# Patient Record
Sex: FEMALE | Race: WHITE | NOT HISPANIC OR LATINO
[De-identification: names, ages, dates, MRNs, and addresses within clinical notes are randomized per-mention and may not be internally consistent; named-entity substitution may affect disease eponyms.]

---

## 2020-06-18 ENCOUNTER — TRANSCRIPTION ENCOUNTER (OUTPATIENT)
Age: 40
End: 2020-06-18

## 2021-09-12 ENCOUNTER — EMERGENCY (EMERGENCY)
Facility: HOSPITAL | Age: 41
LOS: 1 days | Discharge: HOME | End: 2021-09-12
Attending: EMERGENCY MEDICINE | Admitting: EMERGENCY MEDICINE
Payer: MEDICARE

## 2021-09-12 VITALS
OXYGEN SATURATION: 96 % | WEIGHT: 125 LBS | HEART RATE: 78 BPM | HEIGHT: 63 IN | DIASTOLIC BLOOD PRESSURE: 78 MMHG | TEMPERATURE: 98 F | RESPIRATION RATE: 18 BRPM | SYSTOLIC BLOOD PRESSURE: 125 MMHG

## 2021-09-12 VITALS
SYSTOLIC BLOOD PRESSURE: 127 MMHG | RESPIRATION RATE: 18 BRPM | HEART RATE: 82 BPM | TEMPERATURE: 98 F | OXYGEN SATURATION: 96 % | DIASTOLIC BLOOD PRESSURE: 82 MMHG

## 2021-09-12 DIAGNOSIS — Z91.018 ALLERGY TO OTHER FOODS: ICD-10-CM
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
## 2021-09-12 DIAGNOSIS — S52.502A UNSPECIFIED FRACTURE OF THE LOWER END OF LEFT RADIUS, INITIAL ENCOUNTER FOR CLOSED FRACTURE: ICD-10-CM
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
## 2021-09-12 DIAGNOSIS — M25.532 PAIN IN LEFT WRIST: ICD-10-CM
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
## 2021-09-12 PROCEDURE — 73110 X-RAY EXAM OF WRIST: CPT | Mod: 26,LT

## 2021-09-12 PROCEDURE — 99283 EMERGENCY DEPT VISIT LOW MDM: CPT

## 2021-09-12 RX ORDER — ACETAMINOPHEN 500 MG
975 TABLET ORAL ONCE
Refills: 0 | Status: COMPLETED | OUTPATIENT
Start: 2021-09-12 | End: 2021-09-12

## 2021-09-12 RX ORDER — OXYCODONE AND ACETAMINOPHEN 5; 325 MG/1; MG/1
1 TABLET ORAL
Qty: 12 | Refills: 0
Start: 2021-09-12

## 2021-09-12 RX ORDER — OXYCODONE HYDROCHLORIDE 5 MG/1
5 TABLET ORAL ONCE
Refills: 0 | Status: DISCONTINUED | OUTPATIENT
Start: 2021-09-12 | End: 2021-09-12

## 2021-09-12 RX ADMIN — Medication 975 MILLIGRAM(S): at 15:02

## 2021-09-12 RX ADMIN — OXYCODONE HYDROCHLORIDE 5 MILLIGRAM(S): 5 TABLET ORAL at 15:28

## 2021-09-12 NOTE — ED PROVIDER NOTE - CLINICAL SUMMARY MEDICAL DECISION MAKING FREE TEXT BOX
Patient fell on outstreatched LEFT hand while playing with dog, with injury to LEFT wrist.  Went to [facility], had xray showing distal radius fracture.  Came here, was medicated, evaluated, seen by ortho, wrist reduced and splinted, repeat xrays performed showing acceptable reduction.  Will f/u for cast with ortho on 9/20.

## 2021-09-12 NOTE — ED ADULT NURSE NOTE - CHIEF COMPLAINT QUOTE
sent from [facility] for further evaluation of L wrist pain s/p slip and fall. arrives with sling and splint. +arrives with radiology, diagnosed with fx

## 2021-09-12 NOTE — ED PROVIDER NOTE - PATIENT PORTAL LINK FT
You can access the FollowMyHealth Patient Portal offered by [facility] by registering at the following website: [email or web address]. By joining [facility]’s FollowMyHealth portal, you will also be able to view your health information using other applications (apps) compatible with our system.

## 2021-09-12 NOTE — ED ADULT NURSE NOTE - OBJECTIVE STATEMENT
Pt sent from [facility]. Dx with Lt wrist fracture s/p fall today.

## 2021-09-12 NOTE — ED PROVIDER NOTE - CARE PROVIDER_API CALL
Reg Barros)  Orthopaedic Surgery Surgery  [address] [place] [address], [address]  [place] [address]  Phone: [phone]  Fax: [phone]  Follow Up Time:

## 2021-09-12 NOTE — ED PROVIDER NOTE - NSFOLLOWUPINSTRUCTIONS_ED_ALL_ED_FT
Keep splint dry, using garbage bag while showering.  Keep extremity elevated whenever possible.    See the orthopedic doctor (Dr. Barros) on 9/20.  Please call for an appointment in the morning of that day, if possible.  He will be expecting you.  He has offices on [facility] but also here at [facility] on the 6th floor.    Return immediately if pain, numbness, swelling, redness, loss of function, or if you have any other problems or concerns.  Take medication as prescribed.  Do not drive a car, drink alcohol, or operate machinery while taking the medication.    Please see your primary care doctor in the next 2-3 days for a repeat evaluation.  Please take all of today's paperwork with you.  If you don't have a doctor or would like help finding a new one, please contact our call center at [phone].    Please return immediately to the Emergency Department if you have pain, fever, trouble breathing, a rash, or if you have any other problems or concerns at all.   You can reach us at [phone], 24 hours a day, 7 days a week.

## 2021-09-12 NOTE — ED PROVIDER NOTE - OBJECTIVE STATEMENT
Nontoxic well appearing patient in no distress presents via EMS from [facility]. Patient states that she fell while playing with her dog outside landing on her non-dominant LEFT wrist.  Patient reports pain and tenderness to LEFT wrist since.  Patient was able to walk to [facility] where a 3 view left wrist xray was performed showing a comminuted impacted left distal radius fracture. Patient was placed into a splint and sent here for further evaluation.  Patient reports only mild pain at this time.  Mild swelling, no obvious deformity.  NO redness, no bone exposed.  Patient denies numbness, tingling, or loss of function.  NO head injury, no other injuries or complaints.  No LOS or syncope.  Fall was due to her feet getting tangled up while throwing a ball for her dog.

## 2021-09-12 NOTE — ED PROVIDER NOTE - PROGRESS NOTE DETAILS
Xrays from [facility] reviewed via CDROM confirming distal radius fracture.    Case d/w Dr. Barros (ortho) who will be in to see patient shortly.  Patient declines narcotic pain medication at this time, requesting only Tylenol. Dr. Barros at bedside will do reduction and splint/arrange followup Reduction and splinting performed by ortho.  Neurovascularly intact afterwards.  Sent for post-reduction, will f/u with Dr. Barros on 9/20 in the AM.

## 2021-09-12 NOTE — ED ADULT TRIAGE NOTE - CHIEF COMPLAINT QUOTE
sent from [facility] for further evaluation of R wrist pain s/p slip and fall. arrives with sling and splint. +arrives with radiology sent from [facility] for further evaluation of L wrist pain s/p slip and fall. arrives with sling and splint. +arrives with radiology sent from [facility] for further evaluation of L wrist pain s/p slip and fall. arrives with sling and splint. +arrives with radiology, diagnosed with fx

## 2021-09-12 NOTE — ED PROVIDER NOTE - PHYSICAL EXAMINATION
LEFT forearm in pre-katharina volar splint and sling on arrival.  Splint/sling removed and extremity examined.  Mild pain left wrist with tenderness especially on active or passive ROM.  Distal pulses, movement, sensation, strength all intact.  Good cap refill.  Normal tendon exam.  No open wounds.  No proximal injury or pain.  Mild swelling, no discoloration, no obvious deformity.

## 2021-09-12 NOTE — CONSULT NOTE ADULT - SUBJECTIVE AND OBJECTIVE BOX
40F s/p FOOSH left wrist this morning while walking dog. Experienced pain in left wrist, noted deformity. Presented to [facility] ER for evaluation. No medical history aside from MR. Left hand dominant. First time injury. First time fracture. Complaining of left wrist pain. Was seen initially at [facility] who recommended evaluation in ER.      PHMx: none  PSHJx: none  Meds/allergies: NKADA  Social Hx: None  ROS as above    ROS as noted in HPI  Constitutional: Healthy, appearing stated age; well developed, well nourished  Cognitive: Alert and oriented times three with normal mood and affect  Neurologic: Normal sensation and motor function  Skin: Head, neck, and extremities skin is warm, dry and intact without rashes or lesions  Lymphatics: No evidence of lymphadenopathy on the neck or axilla  Neck: No masses, trachea midline  Respiratory: Rate, phythm and effort within normal limits  Cardiovascular: Periperhal pulses 2+ bilaterally, no edema    Left wrist: no open wounds. Obvious deformity, tenderness to palpation.  Unable to tolerate ROM due to pain.  SILT Ax/M/U/R  Firing Wf/WE/io  brisk capillary refill    Imaging;  3 radiographs of left wrist obtained at [facility] were reviewed. Findings discussed with patient and mother. Dorsally displaced and angulated distal radius fracture. Loss of radial height, inclination and volar tilt.    Procedure; closed reduction of left wrist performed.  Post reduction Xray demonstrate improved alignment of fracture.  Well padded and molded sugar tong splint applied.  See procedure note.    Assessment: 40F with left distal radius fracture s/p successful closed reduction    Plan  1. NWB LUE in sling.  2. Follow up in 10 days  3. No surgical intervention      Reg Barros MD  Orthopaedic Surgery    [address] [place] [address] [address] [place] [address]  Office: [phone]

## 2021-09-12 NOTE — PROCEDURE NOTE - ADDITIONAL PROCEDURE DETAILS
Reg Barros MD  Orthopaedic Surgery    [address] [place] [address] [address]  [place] [address]  Office: [phone]

## 2021-09-12 NOTE — ED ADULT NURSE NOTE - DISCHARGE DATE/TIME
Was at [facility] for etoh abuse/withdrawal early December. Left ama  Was told to follow up then  Not appropriate to use benzos with etoh.  We cannot rf, amari w/o reevaluation 12-Sep-2021 16:29

## 2021-09-20 ENCOUNTER — APPOINTMENT (OUTPATIENT)
Dept: RADIOLOGY | Facility: CLINIC | Age: 41
End: 2021-09-20

## 2021-09-20 ENCOUNTER — OUTPATIENT (OUTPATIENT)
Dept: OUTPATIENT SERVICES | Facility: HOSPITAL | Age: 41
LOS: 1 days | End: 2021-09-20
Payer: MEDICARE

## 2021-09-20 PROBLEM — Z78.9 OTHER SPECIFIED HEALTH STATUS: Chronic | Status: ACTIVE | Noted: 2021-09-12

## 2021-09-20 PROCEDURE — 73110 X-RAY EXAM OF WRIST: CPT | Mod: 26,LT

## 2021-10-18 ENCOUNTER — OUTPATIENT (OUTPATIENT)
Dept: OUTPATIENT SERVICES | Facility: HOSPITAL | Age: 41
LOS: 1 days | End: 2021-10-18
Payer: MEDICARE

## 2021-10-18 ENCOUNTER — APPOINTMENT (OUTPATIENT)
Dept: RADIOLOGY | Facility: CLINIC | Age: 41
End: 2021-10-18

## 2021-10-18 PROCEDURE — 73110 X-RAY EXAM OF WRIST: CPT | Mod: 26,LT

## 2021-11-29 ENCOUNTER — OUTPATIENT (OUTPATIENT)
Dept: OUTPATIENT SERVICES | Facility: HOSPITAL | Age: 41
LOS: 1 days | End: 2021-11-29
Payer: MEDICARE

## 2021-11-29 ENCOUNTER — APPOINTMENT (OUTPATIENT)
Dept: RADIOLOGY | Facility: CLINIC | Age: 41
End: 2021-11-29

## 2021-11-29 PROCEDURE — 73110 X-RAY EXAM OF WRIST: CPT | Mod: 26,LT

## 2021-12-21 PROBLEM — Z12.39 SCREENING FOR BREAST CANCER: Status: ACTIVE | Noted: 2021-12-21

## 2021-12-21 PROBLEM — Z11.3 SCREENING FOR STD (SEXUALLY TRANSMITTED DISEASE): Status: ACTIVE | Noted: 2021-12-21

## 2021-12-22 ENCOUNTER — APPOINTMENT (OUTPATIENT)
Dept: INTERNAL MEDICINE | Facility: CLINIC | Age: 41
End: 2021-12-22
Payer: MEDICARE

## 2021-12-22 VITALS
HEIGHT: 63.5 IN | HEART RATE: 88 BPM | BODY MASS INDEX: 21.87 KG/M2 | WEIGHT: 125 LBS | TEMPERATURE: 97.9 F | OXYGEN SATURATION: 97 % | DIASTOLIC BLOOD PRESSURE: 73 MMHG | SYSTOLIC BLOOD PRESSURE: 111 MMHG

## 2021-12-22 DIAGNOSIS — Z12.39 ENCOUNTER FOR OTHER SCREENING FOR MALIGNANT NEOPLASM OF BREAST: ICD-10-CM

## 2021-12-22 DIAGNOSIS — Z11.3 ENCOUNTER FOR SCREENING FOR INFECTIONS WITH A PREDOMINANTLY SEXUAL MODE OF TRANSMISSION: ICD-10-CM

## 2021-12-22 PROCEDURE — 99385 PREV VISIT NEW AGE 18-39: CPT | Mod: GY,25

## 2021-12-22 PROCEDURE — 36415 COLL VENOUS BLD VENIPUNCTURE: CPT

## 2021-12-23 LAB
ALBUMIN SERPL ELPH-MCNC: 4.8 G/DL
ALP BLD-CCNC: 47 U/L
ALT SERPL-CCNC: 12 U/L
ANION GAP SERPL CALC-SCNC: 12 MMOL/L
AST SERPL-CCNC: 13 U/L
BASOPHILS # BLD AUTO: 0.04 K/UL
BASOPHILS NFR BLD AUTO: 0.6 %
BILIRUB SERPL-MCNC: 0.2 MG/DL
BUN SERPL-MCNC: 13 MG/DL
CALCIUM SERPL-MCNC: 9.9 MG/DL
CHLORIDE SERPL-SCNC: 102 MMOL/L
CHOLEST SERPL-MCNC: 211 MG/DL
CO2 SERPL-SCNC: 25 MMOL/L
CREAT SERPL-MCNC: 0.81 MG/DL
EOSINOPHIL # BLD AUTO: 0.14 K/UL
EOSINOPHIL NFR BLD AUTO: 2 %
ESTIMATED AVERAGE GLUCOSE: 105 MG/DL
GLUCOSE SERPL-MCNC: 84 MG/DL
HBA1C MFR BLD HPLC: 5.3 %
HCT VFR BLD CALC: 41.9 %
HDLC SERPL-MCNC: 54 MG/DL
HGB BLD-MCNC: 13 G/DL
HIV1+2 AB SPEC QL IA.RAPID: NONREACTIVE
IMM GRANULOCYTES NFR BLD AUTO: 0.3 %
LDLC SERPL CALC-MCNC: 132 MG/DL
LYMPHOCYTES # BLD AUTO: 2.09 K/UL
LYMPHOCYTES NFR BLD AUTO: 29.2 %
MAN DIFF?: NORMAL
MCHC RBC-ENTMCNC: 29.1 PG
MCHC RBC-ENTMCNC: 31 GM/DL
MCV RBC AUTO: 93.7 FL
MONOCYTES # BLD AUTO: 0.69 K/UL
MONOCYTES NFR BLD AUTO: 9.6 %
NEUTROPHILS # BLD AUTO: 4.18 K/UL
NEUTROPHILS NFR BLD AUTO: 58.3 %
NONHDLC SERPL-MCNC: 157 MG/DL
PLATELET # BLD AUTO: 277 K/UL
POTASSIUM SERPL-SCNC: 4.3 MMOL/L
PROT SERPL-MCNC: 7 G/DL
RBC # BLD: 4.47 M/UL
RBC # FLD: 13.2 %
SODIUM SERPL-SCNC: 139 MMOL/L
TRIGL SERPL-MCNC: 127 MG/DL
WBC # FLD AUTO: 7.16 K/UL

## 2022-11-22 ENCOUNTER — APPOINTMENT (OUTPATIENT)
Dept: INTERNAL MEDICINE | Facility: CLINIC | Age: 42
End: 2022-11-22

## 2022-11-22 ENCOUNTER — LABORATORY RESULT (OUTPATIENT)
Age: 42
End: 2022-11-22

## 2022-11-22 VITALS
WEIGHT: 129 LBS | SYSTOLIC BLOOD PRESSURE: 127 MMHG | BODY MASS INDEX: 22.57 KG/M2 | HEART RATE: 89 BPM | TEMPERATURE: 98.6 F | OXYGEN SATURATION: 96 % | HEIGHT: 63.5 IN | DIASTOLIC BLOOD PRESSURE: 84 MMHG

## 2022-11-22 DIAGNOSIS — E04.9 NONTOXIC GOITER, UNSPECIFIED: ICD-10-CM

## 2022-11-22 DIAGNOSIS — Z13.220 ENCOUNTER FOR SCREENING FOR LIPOID DISORDERS: ICD-10-CM

## 2022-11-22 DIAGNOSIS — Z23 ENCOUNTER FOR IMMUNIZATION: ICD-10-CM

## 2022-11-22 PROCEDURE — 36415 COLL VENOUS BLD VENIPUNCTURE: CPT

## 2022-11-22 PROCEDURE — G0439: CPT

## 2022-11-22 PROCEDURE — G0008: CPT

## 2022-11-22 PROCEDURE — 90686 IIV4 VACC NO PRSV 0.5 ML IM: CPT

## 2022-11-23 LAB
ALBUMIN SERPL ELPH-MCNC: 4.5 G/DL
ALP BLD-CCNC: 60 U/L
ALT SERPL-CCNC: 8 U/L
ANION GAP SERPL CALC-SCNC: 11 MMOL/L
APPEARANCE: ABNORMAL
AST SERPL-CCNC: 12 U/L
BASOPHILS # BLD AUTO: 0.04 K/UL
BASOPHILS NFR BLD AUTO: 0.4 %
BILIRUB SERPL-MCNC: <0.2 MG/DL
BILIRUBIN URINE: NEGATIVE
BLOOD URINE: ABNORMAL
BUN SERPL-MCNC: 10 MG/DL
CALCIUM SERPL-MCNC: 9.7 MG/DL
CHLORIDE SERPL-SCNC: 103 MMOL/L
CHOLEST SERPL-MCNC: 217 MG/DL
CO2 SERPL-SCNC: 26 MMOL/L
COLOR: ABNORMAL
CREAT SERPL-MCNC: 0.7 MG/DL
EGFR: 112 ML/MIN/1.73M2
EOSINOPHIL # BLD AUTO: 0.22 K/UL
EOSINOPHIL NFR BLD AUTO: 2.5 %
ESTIMATED AVERAGE GLUCOSE: 108 MG/DL
GLUCOSE QUALITATIVE U: NEGATIVE
GLUCOSE SERPL-MCNC: 106 MG/DL
HBA1C MFR BLD HPLC: 5.4 %
HCT VFR BLD CALC: 42 %
HDLC SERPL-MCNC: 52 MG/DL
HGB BLD-MCNC: 13.1 G/DL
IMM GRANULOCYTES NFR BLD AUTO: 0.2 %
KETONES URINE: NORMAL
LDLC SERPL CALC-MCNC: 144 MG/DL
LEUKOCYTE ESTERASE URINE: ABNORMAL
LYMPHOCYTES # BLD AUTO: 1.73 K/UL
LYMPHOCYTES NFR BLD AUTO: 19.4 %
MAN DIFF?: NORMAL
MCHC RBC-ENTMCNC: 29.2 PG
MCHC RBC-ENTMCNC: 31.2 GM/DL
MCV RBC AUTO: 93.8 FL
MONOCYTES # BLD AUTO: 1.09 K/UL
MONOCYTES NFR BLD AUTO: 12.2 %
NEUTROPHILS # BLD AUTO: 5.82 K/UL
NEUTROPHILS NFR BLD AUTO: 65.3 %
NITRITE URINE: NEGATIVE
NONHDLC SERPL-MCNC: 165 MG/DL
PH URINE: 6.5
PLATELET # BLD AUTO: 326 K/UL
POTASSIUM SERPL-SCNC: 4.7 MMOL/L
PROT SERPL-MCNC: 7.1 G/DL
PROTEIN URINE: ABNORMAL
RBC # BLD: 4.48 M/UL
RBC # FLD: 13 %
SODIUM SERPL-SCNC: 140 MMOL/L
SPECIFIC GRAVITY URINE: 1.04
T4 FREE SERPL-MCNC: 1.2 NG/DL
THYROGLOB AB SERPL-ACNC: <20 IU/ML
THYROPEROXIDASE AB SERPL IA-ACNC: <10 IU/ML
TRIGL SERPL-MCNC: 106 MG/DL
TSH SERPL-ACNC: 0.66 UIU/ML
UROBILINOGEN URINE: NORMAL
WBC # FLD AUTO: 8.92 K/UL

## 2022-11-25 ENCOUNTER — RESULT REVIEW (OUTPATIENT)
Age: 42
End: 2022-11-25

## 2022-11-25 ENCOUNTER — OUTPATIENT (OUTPATIENT)
Dept: OUTPATIENT SERVICES | Facility: HOSPITAL | Age: 42
LOS: 1 days | End: 2022-11-25

## 2022-11-25 ENCOUNTER — APPOINTMENT (OUTPATIENT)
Dept: ULTRASOUND IMAGING | Facility: CLINIC | Age: 42
End: 2022-11-25

## 2022-11-25 PROCEDURE — 76536 US EXAM OF HEAD AND NECK: CPT | Mod: 26

## 2023-03-14 ENCOUNTER — OUTPATIENT (OUTPATIENT)
Dept: OUTPATIENT SERVICES | Facility: HOSPITAL | Age: 43
LOS: 1 days | End: 2023-03-14

## 2023-03-14 ENCOUNTER — APPOINTMENT (OUTPATIENT)
Dept: ULTRASOUND IMAGING | Facility: CLINIC | Age: 43
End: 2023-03-14
Payer: MEDICARE

## 2023-03-14 ENCOUNTER — APPOINTMENT (OUTPATIENT)
Dept: MAMMOGRAPHY | Facility: CLINIC | Age: 43
End: 2023-03-14
Payer: MEDICARE

## 2023-03-14 PROCEDURE — G0279: CPT | Mod: 26

## 2023-03-14 PROCEDURE — 76641 ULTRASOUND BREAST COMPLETE: CPT | Mod: 26,50,GZ

## 2023-03-14 PROCEDURE — 77066 DX MAMMO INCL CAD BI: CPT | Mod: 26

## 2023-04-17 ENCOUNTER — APPOINTMENT (OUTPATIENT)
Dept: DERMATOLOGY | Facility: CLINIC | Age: 43
End: 2023-04-17
Payer: MEDICARE

## 2023-04-17 DIAGNOSIS — Z13.21 ENCOUNTER FOR SCREENING FOR NUTRITIONAL DISORDER: ICD-10-CM

## 2023-04-17 PROCEDURE — 99204 OFFICE O/P NEW MOD 45 MIN: CPT

## 2023-04-17 NOTE — HISTORY OF PRESENT ILLNESS
[FreeTextEntry1] : Moles [de-identified] : No personal history of skin cancer or abnormal moles. No family history of skin cancer. No other new or changing lesions. Concerned about back because cannot see. \par

## 2023-04-17 NOTE — PHYSICAL EXAM
[Alert] : alert [Oriented x 3] : ~L oriented x 3 [Well Nourished] : well nourished [Conjunctiva Non-injected] : conjunctiva non-injected [No Visual Lymphadenopathy] : no visual  lymphadenopathy [No Clubbing] : no clubbing [No Edema] : no edema [No Bromhidrosis] : no bromhidrosis [No Chromhidrosis] : no chromhidrosis [Full Body Skin Exam Performed] : performed [FreeTextEntry3] : white skin, medium brown papules L upper arm and R forearm, flaking of scalp\par brown stuck on papules back\par legs with follicular erythema and patchy alopecia with long corkscrew hairs

## 2023-04-24 LAB — VIT C SERPL-MCNC: 0.8 MG/DL

## 2023-04-25 ENCOUNTER — NON-APPOINTMENT (OUTPATIENT)
Age: 43
End: 2023-04-25

## 2023-07-18 NOTE — ED ADULT NURSE NOTE - TEMPLATE
Orthopedic Gabapentin Counseling: I discussed with the patient the risks of gabapentin including but not limited to dizziness, somnolence, fatigue and ataxia.

## 2023-11-02 ENCOUNTER — LABORATORY RESULT (OUTPATIENT)
Age: 43
End: 2023-11-02

## 2023-11-02 ENCOUNTER — APPOINTMENT (OUTPATIENT)
Dept: INTERNAL MEDICINE | Facility: CLINIC | Age: 43
End: 2023-11-02
Payer: MEDICARE

## 2023-11-02 VITALS
HEART RATE: 78 BPM | DIASTOLIC BLOOD PRESSURE: 69 MMHG | BODY MASS INDEX: 22.25 KG/M2 | WEIGHT: 127.13 LBS | SYSTOLIC BLOOD PRESSURE: 106 MMHG | TEMPERATURE: 98.2 F | OXYGEN SATURATION: 97 % | HEIGHT: 63.5 IN

## 2023-11-02 DIAGNOSIS — Z13.1 ENCOUNTER FOR SCREENING FOR DIABETES MELLITUS: ICD-10-CM

## 2023-11-02 DIAGNOSIS — J45.909 UNSPECIFIED ASTHMA, UNCOMPLICATED: ICD-10-CM

## 2023-11-02 DIAGNOSIS — E78.00 PURE HYPERCHOLESTEROLEMIA, UNSPECIFIED: ICD-10-CM

## 2023-11-02 DIAGNOSIS — Z00.00 ENCOUNTER FOR GENERAL ADULT MEDICAL EXAMINATION W/OUT ABNORMAL FINDINGS: ICD-10-CM

## 2023-11-02 PROCEDURE — 36415 COLL VENOUS BLD VENIPUNCTURE: CPT

## 2023-11-02 PROCEDURE — G0439: CPT

## 2023-11-02 RX ORDER — ALBUTEROL SULFATE 90 UG/1
108 (90 BASE) INHALANT RESPIRATORY (INHALATION)
Qty: 1 | Refills: 0 | Status: ACTIVE | COMMUNITY
Start: 2021-12-22 | End: 1900-01-01

## 2023-11-03 LAB
ALBUMIN SERPL ELPH-MCNC: 4.8 G/DL
ALP BLD-CCNC: 47 U/L
ALT SERPL-CCNC: 13 U/L
ANION GAP SERPL CALC-SCNC: 13 MMOL/L
APPEARANCE: CLEAR
AST SERPL-CCNC: 11 U/L
BASOPHILS # BLD AUTO: 0.09 K/UL
BASOPHILS NFR BLD AUTO: 1.7 %
BILIRUB SERPL-MCNC: 0.3 MG/DL
BILIRUBIN URINE: NEGATIVE
BLOOD URINE: NEGATIVE
BUN SERPL-MCNC: 16 MG/DL
CALCIUM SERPL-MCNC: 10.2 MG/DL
CHLORIDE SERPL-SCNC: 103 MMOL/L
CHOLEST SERPL-MCNC: 223 MG/DL
CO2 SERPL-SCNC: 24 MMOL/L
COLOR: YELLOW
CREAT SERPL-MCNC: 0.78 MG/DL
EGFR: 98 ML/MIN/1.73M2
EOSINOPHIL # BLD AUTO: 0.09 K/UL
EOSINOPHIL NFR BLD AUTO: 1.7 %
ESTIMATED AVERAGE GLUCOSE: 111 MG/DL
GLUCOSE QUALITATIVE U: NEGATIVE MG/DL
GLUCOSE SERPL-MCNC: 89 MG/DL
HBA1C MFR BLD HPLC: 5.5 %
HCT VFR BLD CALC: 43.1 %
HDLC SERPL-MCNC: 60 MG/DL
HGB BLD-MCNC: 13.5 G/DL
IRON SERPL-MCNC: 74 UG/DL
KETONES URINE: NEGATIVE MG/DL
LDLC SERPL CALC-MCNC: 138 MG/DL
LEUKOCYTE ESTERASE URINE: NEGATIVE
LYMPHOCYTES # BLD AUTO: 1.82 K/UL
LYMPHOCYTES NFR BLD AUTO: 35.7 %
MAN DIFF?: NORMAL
MCHC RBC-ENTMCNC: 29.2 PG
MCHC RBC-ENTMCNC: 31.3 GM/DL
MCV RBC AUTO: 93.3 FL
MONOCYTES # BLD AUTO: 1.15 K/UL
MONOCYTES NFR BLD AUTO: 22.6 %
NEUTROPHILS # BLD AUTO: 1.64 K/UL
NEUTROPHILS NFR BLD AUTO: 32.2 %
NITRITE URINE: NEGATIVE
NONHDLC SERPL-MCNC: 163 MG/DL
PH URINE: 6.5
PLATELET # BLD AUTO: 243 K/UL
POTASSIUM SERPL-SCNC: 5.4 MMOL/L
PROT SERPL-MCNC: 7.5 G/DL
PROTEIN URINE: NEGATIVE MG/DL
RBC # BLD: 4.62 M/UL
RBC # FLD: 13.7 %
SODIUM SERPL-SCNC: 140 MMOL/L
SPECIFIC GRAVITY URINE: 1.01
TRIGL SERPL-MCNC: 137 MG/DL
TSH SERPL-ACNC: 1.36 UIU/ML
UROBILINOGEN URINE: 0.2 MG/DL
WBC # FLD AUTO: 5.09 K/UL

## 2023-12-26 ENCOUNTER — APPOINTMENT (OUTPATIENT)
Dept: INTERNAL MEDICINE | Facility: CLINIC | Age: 43
End: 2023-12-26

## 2023-12-26 VITALS
WEIGHT: 127 LBS | HEART RATE: 94 BPM | OXYGEN SATURATION: 97 % | SYSTOLIC BLOOD PRESSURE: 123 MMHG | DIASTOLIC BLOOD PRESSURE: 80 MMHG | TEMPERATURE: 97.1 F | BODY MASS INDEX: 22.23 KG/M2 | HEIGHT: 63.5 IN

## 2024-04-15 ENCOUNTER — APPOINTMENT (OUTPATIENT)
Dept: DERMATOLOGY | Facility: CLINIC | Age: 44
End: 2024-04-15
Payer: MEDICARE

## 2024-04-15 DIAGNOSIS — Z12.83 ENCOUNTER FOR SCREENING FOR MALIGNANT NEOPLASM OF SKIN: ICD-10-CM

## 2024-04-15 DIAGNOSIS — L82.1 OTHER SEBORRHEIC KERATOSIS: ICD-10-CM

## 2024-04-15 PROCEDURE — 99214 OFFICE O/P EST MOD 30 MIN: CPT

## 2024-04-15 RX ORDER — KETOCONAZOLE 20.5 MG/ML
2 SHAMPOO, SUSPENSION TOPICAL
Qty: 1 | Refills: 11 | Status: ACTIVE | COMMUNITY
Start: 2023-04-17 | End: 1900-01-01

## 2024-04-15 NOTE — HISTORY OF PRESENT ILLNESS
[FreeTextEntry1] : fu moles  [de-identified] : estab last visit april 2023 - cbe, seb derm - keto shampoo - lost rx wants referral for a new gyn last visit had perifollicular erythema on legs - vit c wnl when questioned - some hairloss at front of scalp but no burning or itch

## 2024-04-15 NOTE — PHYSICAL EXAM
[Alert] : alert [Oriented x 3] : ~L oriented x 3 [Well Nourished] : well nourished [Conjunctiva Non-injected] : conjunctiva non-injected [No Visual Lymphadenopathy] : no visual  lymphadenopathy [No Clubbing] : no clubbing [No Edema] : no edema [No Bromhidrosis] : no bromhidrosis [No Chromhidrosis] : no chromhidrosis [Full Body Skin Exam Performed] : performed [FreeTextEntry3] : patchy alopecia medial frontal scalp with follicular accentuation but no erythema or scale, vellus hairs intact/present, follicular erythema and corkscrews legs w patchy alopecia  white skin

## 2024-04-19 ENCOUNTER — APPOINTMENT (OUTPATIENT)
Dept: DERMATOLOGY | Facility: CLINIC | Age: 44
End: 2024-04-19
Payer: MEDICARE

## 2024-04-19 DIAGNOSIS — L21.9 SEBORRHEIC DERMATITIS, UNSPECIFIED: ICD-10-CM

## 2024-04-19 DIAGNOSIS — D48.5 NEOPLASM OF UNCERTAIN BEHAVIOR OF SKIN: ICD-10-CM

## 2024-04-19 DIAGNOSIS — L65.9 NONSCARRING HAIR LOSS, UNSPECIFIED: ICD-10-CM

## 2024-04-19 PROCEDURE — 99214 OFFICE O/P EST MOD 30 MIN: CPT | Mod: 25

## 2024-04-19 PROCEDURE — 11104 PUNCH BX SKIN SINGLE LESION: CPT

## 2024-04-20 PROBLEM — L21.9 SEBORRHEIC DERMATITIS OF SCALP: Status: ACTIVE | Noted: 2023-04-17

## 2024-04-20 PROBLEM — L65.9 ALOPECIA: Status: ACTIVE | Noted: 2024-04-15

## 2024-04-20 NOTE — HISTORY OF PRESENT ILLNESS
[FreeTextEntry1] : fu hair loss, scaly scalp [de-identified] : RP Has been seeing Dr Stanford in the past Given keto shampoo for seb derm but has not started using yet Concerned about hair loss over frontal scalp- discussed possible biopsy at last visit  This area is without burning or itch also has perifollicular erythema on legs - vit c wnl previously checked

## 2024-04-20 NOTE — PHYSICAL EXAM
[Alert] : alert [Oriented x 3] : ~L oriented x 3 [Well Nourished] : well nourished [Conjunctiva Non-injected] : conjunctiva non-injected [No Visual Lymphadenopathy] : no visual  lymphadenopathy [No Clubbing] : no clubbing [No Edema] : no edema [No Bromhidrosis] : no bromhidrosis [No Chromhidrosis] : no chromhidrosis [Full Body Skin Exam Performed] : performed [FreeTextEntry3] : patchy alopecia medial frontal scalp with follicular accentuation and some pink erythema but no scale, vellus hairs intact/present, follicular erythema and corkscrews legs w patchy alopecia  white skin

## 2024-04-20 NOTE — ASSESSMENT
[FreeTextEntry1] : # Neoplasm of uncertain behavior Location: Frontal scalp DDx: R/o scarring alopecia (?LPP) Biopsy by 4 mm punch  The risks/benefits/alternatives of skin biopsy were explained to the patient, which include and are not limited to bleeding, infection, scarring or discoloration of skin, and recurrence of lesion. Patient expressed understanding of these risks and provided consent to the procedure. Time out with verification of patient and lesion site was performed. Site was prepped with rubbing alcohol, lidocaine with epinephrine was injected for anesthesia, and biopsy was performed. Specimen sent to path.  Procedure was without complication and well tolerated. Wound care was discussed.  f/u in 2 weeks for suture removal and biopsy results discussion  # Alopecia -medial frontal scalp and legs -?LPP; punch biopsy of scalp as above  # Seb derm- chronic, flaring - Start ketoconazole shampoo 2-3x/week. SED. Instructed to leave on for 5-10 min prior to rinsing - Can alternate with OTC anti-fungal shampoo like Extra Strength Head and Shoulders, Selsun Blue, or T/Gel shampoo  RTC 2 weeks

## 2024-05-09 ENCOUNTER — APPOINTMENT (OUTPATIENT)
Dept: OBGYN | Facility: CLINIC | Age: 44
End: 2024-05-09
Payer: MEDICARE

## 2024-05-09 ENCOUNTER — LABORATORY RESULT (OUTPATIENT)
Age: 44
End: 2024-05-09

## 2024-05-09 VITALS
WEIGHT: 120 LBS | SYSTOLIC BLOOD PRESSURE: 117 MMHG | OXYGEN SATURATION: 96 % | DIASTOLIC BLOOD PRESSURE: 72 MMHG | HEART RATE: 88 BPM | BODY MASS INDEX: 21.26 KG/M2 | HEIGHT: 63 IN

## 2024-05-09 DIAGNOSIS — R92.30 DENSE BREASTS, UNSPECIFIED: ICD-10-CM

## 2024-05-09 DIAGNOSIS — Z01.419 ENCOUNTER FOR GYNECOLOGICAL EXAMINATION (GENERAL) (ROUTINE) W/OUT ABNORMAL FINDINGS: ICD-10-CM

## 2024-05-09 PROCEDURE — G0101: CPT | Mod: GA

## 2024-05-09 NOTE — HISTORY OF PRESENT ILLNESS
[Patient reported PAP Smear was normal] : Patient reported PAP Smear was normal [No] : never pregnant [Normal Amount/Duration] :  normal amount and duration [Regular Cycle Intervals] : periods have been regular [Previously active] : previously active [Men] : men [FreeTextEntry1] : 43-year-old present for well women's visit. [PapSmeardate] : 2023 [LMPDate] : 05/03/24

## 2024-05-09 NOTE — PHYSICAL EXAM
[Chaperone Present] : A chaperone was present in the examining room during all aspects of the physical examination [71728] : A chaperone was present during the pelvic exam. [FreeTextEntry2] : lorne [Appropriately responsive] : appropriately responsive [Alert] : alert [No Acute Distress] : no acute distress [No Lymphadenopathy] : no lymphadenopathy [Regular Rate Rhythm] : regular rate rhythm [No Murmurs] : no murmurs [Clear to Auscultation B/L] : clear to auscultation bilaterally [Soft] : soft [Non-tender] : non-tender [Non-distended] : non-distended [No Lesions] : no lesions [No Mass] : no mass [Oriented x3] : oriented x3 [Examination Of The Breasts] : a normal appearance [No Masses] : no breast masses were palpable [Labia Majora] : normal [Labia Minora] : normal [Normal] : normal [Uterine Adnexae] : normal

## 2024-05-09 NOTE — PLAN
[FreeTextEntry1] : annual: pap and hpv and gc/ct  *unclear historian of past gyn hx not currently s/a breast imaging ordered routine health mtc discussed sees pmd in system

## 2024-05-10 ENCOUNTER — APPOINTMENT (OUTPATIENT)
Dept: DERMATOLOGY | Facility: CLINIC | Age: 44
End: 2024-05-10
Payer: MEDICARE

## 2024-05-10 PROCEDURE — 99213 OFFICE O/P EST LOW 20 MIN: CPT

## 2024-05-10 NOTE — HISTORY OF PRESENT ILLNESS
[FreeTextEntry1] : fu hair loss [de-identified] : RP Bx of hair loss in scalp consistent with AGA Never treated

## 2024-05-10 NOTE — ASSESSMENT
[FreeTextEntry1] : # AGA- chronic, progressive, not at treatment goal -Bx showing AGA - Recommend trial of minoxidil 5% foam daily. Discussed proper application to affected areas of scalp, avoidance of face due to risk of hypertrichosis, need for 6-9 months of consistent use to see effect, possible shedding with initial use and that results are dependent on continued use of product. - Briefly discussed po options though given mild involvement will start with topical  RTC prn

## 2024-05-10 NOTE — PHYSICAL EXAM
[Alert] : alert [Oriented x 3] : ~L oriented x 3 [Well Nourished] : well nourished [Conjunctiva Non-injected] : conjunctiva non-injected [No Visual Lymphadenopathy] : no visual  lymphadenopathy [No Clubbing] : no clubbing [No Edema] : no edema [No Bromhidrosis] : no bromhidrosis [No Chromhidrosis] : no chromhidrosis [Full Body Skin Exam Performed] : performed [FreeTextEntry3] : patchy alopecia medial frontal scalp with follicular accentuation and some pink erythema but no scale, vellus hairs intact/present

## 2024-05-12 LAB — DERMATOLOGY BIOPSY: NORMAL

## 2024-05-13 LAB
C TRACH RRNA SPEC QL NAA+PROBE: NOT DETECTED
HPV HIGH+LOW RISK DNA PNL CVX: DETECTED
N GONORRHOEA RRNA SPEC QL NAA+PROBE: NOT DETECTED
SOURCE TP AMPLIFICATION: NORMAL

## 2024-05-20 LAB — CYTOLOGY CVX/VAG DOC THIN PREP: ABNORMAL

## 2024-05-24 ENCOUNTER — APPOINTMENT (OUTPATIENT)
Dept: DERMATOLOGY | Facility: CLINIC | Age: 44
End: 2024-05-24
Payer: MEDICARE

## 2024-05-24 ENCOUNTER — APPOINTMENT (OUTPATIENT)
Dept: OBGYN | Facility: CLINIC | Age: 44
End: 2024-05-24
Payer: MEDICARE

## 2024-05-24 VITALS
HEART RATE: 80 BPM | BODY MASS INDEX: 21.79 KG/M2 | OXYGEN SATURATION: 86 % | SYSTOLIC BLOOD PRESSURE: 118 MMHG | DIASTOLIC BLOOD PRESSURE: 73 MMHG | WEIGHT: 123 LBS | HEIGHT: 63 IN

## 2024-05-24 DIAGNOSIS — R87.810 ATYPICAL SQUAMOUS CELLS OF UNDETERMINED SIGNIFICANCE ON CYTOLOGIC SMEAR OF CERVIX (ASC-US): ICD-10-CM

## 2024-05-24 DIAGNOSIS — L64.9 ANDROGENIC ALOPECIA, UNSPECIFIED: ICD-10-CM

## 2024-05-24 DIAGNOSIS — R87.610 ATYPICAL SQUAMOUS CELLS OF UNDETERMINED SIGNIFICANCE ON CYTOLOGIC SMEAR OF CERVIX (ASC-US): ICD-10-CM

## 2024-05-24 PROCEDURE — 57454 BX/CURETT OF CERVIX W/SCOPE: CPT

## 2024-05-24 PROCEDURE — 99213 OFFICE O/P EST LOW 20 MIN: CPT

## 2024-05-24 NOTE — PROCEDURE
[Colposcopy] : Colposcopy  [Time out performed] : Pre-procedure time out performed.  Patient's name, date of birth and procedure confirmed. [Consent Obtained] : Consent obtained [Risks] : risks [Benefits] : benefits [Alternatives] : alternatives [Patient] : patient [Guardian] : guardian [Infection] : infection [Bleeding] : bleeding [Allergic Reaction] : allergic reaction [ASCUS] : ASCUS [HPV High Risk] : HPV high risk [No Premedication] : no premedication [Colposcopy Adequate] : colposcopy adequate [Pap Performed] : pap not performed [SCI Fully Visualized] : SCI fully visualized [ECC Performed] : ECC performed [No Abnormalities] : no abnormalities [Lesion] : lesion seen [Biopsy] : biopsy taken [Hemostasis Obtained] : Hemostasis obtained [Tolerated Well] : the patient tolerated the procedure well [de-identified] : mother [de-identified] : 2 [de-identified] : acetowhite pacth 6 ock [de-identified] : ecc 6 ock [de-identified] : possible nl vs low grade

## 2024-05-26 PROBLEM — L64.9 ANDROGENETIC ALOPECIA: Status: ACTIVE | Noted: 2024-05-10

## 2024-05-26 NOTE — ASSESSMENT
[Use of independent historian: [ enter independent historian's relationship to patient ] :____] : As the patient was unable to provide a complete and reliable history, I obtained clinical history from the patient's [unfilled] [FreeTextEntry1] : # AGA- chronic, progressive, not at treatment goal -Bx showing AGA - Recommend trial of minoxidil 5% foam daily. Discussed proper application to affected areas of scalp, avoidance of face due to risk of hypertrichosis, need for 6-9 months of consistent use to see effect, possible shedding with initial use and that results are dependent on continued use of product. Extensive discussion regarding use - Briefly discussed po options though given mild involvement will start with topical  RTC prn

## 2024-05-26 NOTE — HISTORY OF PRESENT ILLNESS
[FreeTextEntry1] : fu hair loss [de-identified] : RP Bx of hair loss in scalp consistent with AGA Never treated, recommended minoxidil at last visit but patient unclear on how to use

## 2024-06-01 ENCOUNTER — OUTPATIENT (OUTPATIENT)
Dept: OUTPATIENT SERVICES | Facility: HOSPITAL | Age: 44
LOS: 1 days | End: 2024-06-01

## 2024-06-01 ENCOUNTER — RESULT REVIEW (OUTPATIENT)
Age: 44
End: 2024-06-01

## 2024-06-01 ENCOUNTER — APPOINTMENT (OUTPATIENT)
Dept: ULTRASOUND IMAGING | Facility: CLINIC | Age: 44
End: 2024-06-01
Payer: MEDICARE

## 2024-06-01 ENCOUNTER — APPOINTMENT (OUTPATIENT)
Dept: MAMMOGRAPHY | Facility: CLINIC | Age: 44
End: 2024-06-01
Payer: MEDICARE

## 2024-06-01 PROCEDURE — 77063 BREAST TOMOSYNTHESIS BI: CPT | Mod: 26

## 2024-06-01 PROCEDURE — 76641 ULTRASOUND BREAST COMPLETE: CPT | Mod: 26,50

## 2024-06-01 PROCEDURE — 77067 SCR MAMMO BI INCL CAD: CPT | Mod: 26

## 2024-06-19 LAB — CORE LAB BIOPSY: NORMAL

## 2024-07-25 NOTE — ED PROVIDER NOTE - CROS ED CONS ALL NEG
1. Have you been to the ER, urgent care clinic since your last visit?  Hospitalized since your last visit?No    2. Have you seen or consulted any other health care providers outside of the [facility] since your last visit?  Include any pap smears or colon screening. No     Chief Complaint   Patient presents with    Medicare AWV    Hypertension    Cholesterol Problem         PHQ-9 Total Score: 0 (7/25/2024  2:24 PM)       Height: 1.626 m (5' 4\")      Body mass index is 37.93 kg/m².        General Appearance: alert and oriented to person, place and time, well developed and well- nourished, in no acute distress  Skin: warm and dry, no rash or erythema  Head: normocephalic and atraumatic  Eyes: pupils equal, round, and reactive to light, extraocular eye movements intact, conjunctivae normal  ENT: tympanic membrane, external ear and ear canal normal bilaterally, nose without deformity, nasal mucosa and turbinates normal without polyps  Neck: supple and non-tender without mass, no thyromegaly or thyroid nodules, no cervical lymphadenopathy  Pulmonary/Chest: clear to auscultation bilaterally- no wheezes, rales or rhonchi, normal air movement, no respiratory distress  Cardiovascular: normal rate, regular rhythm, normal S1 and S2, no murmurs, rubs, clicks, or gallops, distal pulses intact, no carotid bruits  Abdomen: soft, non-tender, non-distended, normal bowel sounds, no masses or organomegaly  Extremities: no cyanosis, clubbing or edema  Musculoskeletal: normal range of motion, no joint swelling, deformity or tenderness  Neurologic: reflexes normal and symmetric, no cranial nerve deficit, gait, coordination and speech normal   Rt.great toe discoloration noted         Allergies   Allergen Reactions    Bee Venom      Other reaction(s): Unknown (comments)     Prior to Visit Medications    Medication Sig Taking? Authorizing Provider   hydroCHLOROthiazide (HYDRODIURIL) 25 MG tablet Take 1 tablet by mouth daily Yes Michael Busby Jr., MD   Efinaconazole 10 % SOLN Sig:apply to  entire toe nail surface area daily for 48  weeks Yes Michael Busby Jr., MD   amLODIPine (NORVASC) 10 MG tablet TAKE 1 TABLET BY MOUTH DAILY Yes Michael Busby Jr., MD   atorvastatin (LIPITOR) 40 MG tablet TAKE 1 TABLET BY MOUTH DAILY Yes Michael Busby Jr., MD   diclofenac sodium (VOLTAREN) 1 % GEL Apply topically 4 times daily Yes Automatic Reconciliation, Ar  negative...

## 2024-11-05 ENCOUNTER — APPOINTMENT (OUTPATIENT)
Dept: INTERNAL MEDICINE | Facility: CLINIC | Age: 44
End: 2024-11-05
Payer: MEDICARE

## 2024-11-05 VITALS
WEIGHT: 130 LBS | HEART RATE: 76 BPM | DIASTOLIC BLOOD PRESSURE: 82 MMHG | SYSTOLIC BLOOD PRESSURE: 124 MMHG | TEMPERATURE: 98.4 F | OXYGEN SATURATION: 96 % | BODY MASS INDEX: 23.04 KG/M2 | HEIGHT: 63 IN

## 2024-11-05 DIAGNOSIS — J45.909 UNSPECIFIED ASTHMA, UNCOMPLICATED: ICD-10-CM

## 2024-11-05 DIAGNOSIS — Z00.00 ENCOUNTER FOR GENERAL ADULT MEDICAL EXAMINATION W/OUT ABNORMAL FINDINGS: ICD-10-CM

## 2024-11-05 DIAGNOSIS — Z13.1 ENCOUNTER FOR SCREENING FOR DIABETES MELLITUS: ICD-10-CM

## 2024-11-05 DIAGNOSIS — Z13.220 ENCOUNTER FOR SCREENING FOR LIPOID DISORDERS: ICD-10-CM

## 2024-11-05 DIAGNOSIS — Z23 ENCOUNTER FOR IMMUNIZATION: ICD-10-CM

## 2024-11-05 DIAGNOSIS — E78.00 PURE HYPERCHOLESTEROLEMIA, UNSPECIFIED: ICD-10-CM

## 2024-11-05 DIAGNOSIS — E04.9 NONTOXIC GOITER, UNSPECIFIED: ICD-10-CM

## 2024-11-05 PROCEDURE — G0008: CPT

## 2024-11-05 PROCEDURE — G0439: CPT

## 2024-11-05 PROCEDURE — 36415 COLL VENOUS BLD VENIPUNCTURE: CPT

## 2024-11-05 PROCEDURE — 90656 IIV3 VACC NO PRSV 0.5 ML IM: CPT

## 2024-11-06 LAB
ALBUMIN SERPL ELPH-MCNC: 4.5 G/DL
ALP BLD-CCNC: 53 U/L
ALT SERPL-CCNC: 16 U/L
ANION GAP SERPL CALC-SCNC: 15 MMOL/L
APPEARANCE: CLEAR
AST SERPL-CCNC: 13 U/L
BASOPHILS # BLD AUTO: 0.05 K/UL
BASOPHILS NFR BLD AUTO: 0.8 %
BILIRUB SERPL-MCNC: 0.3 MG/DL
BILIRUBIN URINE: NEGATIVE
BLOOD URINE: NEGATIVE
BUN SERPL-MCNC: 16 MG/DL
CALCIUM SERPL-MCNC: 9.7 MG/DL
CHLORIDE SERPL-SCNC: 103 MMOL/L
CHOLEST SERPL-MCNC: 230 MG/DL
CO2 SERPL-SCNC: 20 MMOL/L
COLOR: YELLOW
CREAT SERPL-MCNC: 0.67 MG/DL
EGFR: 112 ML/MIN/1.73M2
EOSINOPHIL # BLD AUTO: 0.11 K/UL
EOSINOPHIL NFR BLD AUTO: 1.8 %
ESTIMATED AVERAGE GLUCOSE: 105 MG/DL
GLUCOSE QUALITATIVE U: NEGATIVE MG/DL
GLUCOSE SERPL-MCNC: 86 MG/DL
HBA1C MFR BLD HPLC: 5.3 %
HCT VFR BLD CALC: 39.9 %
HDLC SERPL-MCNC: 61 MG/DL
HGB BLD-MCNC: 12.3 G/DL
IMM GRANULOCYTES NFR BLD AUTO: 0.2 %
KETONES URINE: NEGATIVE MG/DL
LDLC SERPL CALC-MCNC: 147 MG/DL
LEUKOCYTE ESTERASE URINE: NEGATIVE
LYMPHOCYTES # BLD AUTO: 1.76 K/UL
LYMPHOCYTES NFR BLD AUTO: 28.5 %
MAN DIFF?: NORMAL
MCHC RBC-ENTMCNC: 28.3 PG
MCHC RBC-ENTMCNC: 30.8 G/DL
MCV RBC AUTO: 91.7 FL
MONOCYTES # BLD AUTO: 0.65 K/UL
MONOCYTES NFR BLD AUTO: 10.5 %
NEUTROPHILS # BLD AUTO: 3.6 K/UL
NEUTROPHILS NFR BLD AUTO: 58.2 %
NITRITE URINE: NEGATIVE
NONHDLC SERPL-MCNC: 169 MG/DL
PH URINE: 7
PLATELET # BLD AUTO: 259 K/UL
POTASSIUM SERPL-SCNC: 4.9 MMOL/L
PROT SERPL-MCNC: 7 G/DL
PROTEIN URINE: NEGATIVE MG/DL
RBC # BLD: 4.35 M/UL
RBC # FLD: 13.8 %
SODIUM SERPL-SCNC: 138 MMOL/L
SPECIFIC GRAVITY URINE: 1.01
TRIGL SERPL-MCNC: 126 MG/DL
TSH SERPL-ACNC: 1.38 UIU/ML
UROBILINOGEN URINE: 0.2 MG/DL
WBC # FLD AUTO: 6.18 K/UL

## 2024-11-25 ENCOUNTER — LABORATORY RESULT (OUTPATIENT)
Age: 44
End: 2024-11-25

## 2024-11-25 ENCOUNTER — APPOINTMENT (OUTPATIENT)
Dept: OBGYN | Facility: CLINIC | Age: 44
End: 2024-11-25
Payer: MEDICARE

## 2024-11-25 VITALS
WEIGHT: 127 LBS | OXYGEN SATURATION: 97 % | HEIGHT: 63 IN | SYSTOLIC BLOOD PRESSURE: 117 MMHG | BODY MASS INDEX: 22.5 KG/M2 | HEART RATE: 87 BPM | DIASTOLIC BLOOD PRESSURE: 80 MMHG

## 2024-11-25 DIAGNOSIS — R87.810 ATYPICAL SQUAMOUS CELLS OF UNDETERMINED SIGNIFICANCE ON CYTOLOGIC SMEAR OF CERVIX (ASC-US): ICD-10-CM

## 2024-11-25 DIAGNOSIS — R87.610 ATYPICAL SQUAMOUS CELLS OF UNDETERMINED SIGNIFICANCE ON CYTOLOGIC SMEAR OF CERVIX (ASC-US): ICD-10-CM

## 2024-11-25 DIAGNOSIS — R92.30 DENSE BREASTS, UNSPECIFIED: ICD-10-CM

## 2024-11-25 PROCEDURE — 99213 OFFICE O/P EST LOW 20 MIN: CPT

## 2024-11-25 PROCEDURE — 99459 PELVIC EXAMINATION: CPT

## 2024-11-27 LAB — HPV HIGH+LOW RISK DNA PNL CVX: DETECTED

## 2024-12-05 LAB — CYTOLOGY CVX/VAG DOC THIN PREP: ABNORMAL

## 2025-01-24 ENCOUNTER — APPOINTMENT (OUTPATIENT)
Dept: DERMATOLOGY | Facility: CLINIC | Age: 45
End: 2025-01-24
Payer: MEDICARE

## 2025-01-24 ENCOUNTER — NON-APPOINTMENT (OUTPATIENT)
Age: 45
End: 2025-01-24

## 2025-01-24 DIAGNOSIS — D48.5 NEOPLASM OF UNCERTAIN BEHAVIOR OF SKIN: ICD-10-CM

## 2025-01-24 DIAGNOSIS — L64.9 ANDROGENIC ALOPECIA, UNSPECIFIED: ICD-10-CM

## 2025-01-24 PROCEDURE — 99213 OFFICE O/P EST LOW 20 MIN: CPT | Mod: 25

## 2025-01-24 PROCEDURE — 11103 TANGNTL BX SKIN EA SEP/ADDL: CPT

## 2025-01-24 PROCEDURE — 11102 TANGNTL BX SKIN SINGLE LES: CPT

## 2025-01-25 PROBLEM — D48.5 NEOPLASM OF UNCERTAIN BEHAVIOR OF SKIN OF EYELID: Status: ACTIVE | Noted: 2025-01-25

## 2025-01-28 LAB — DERMATOLOGY BIOPSY: NORMAL

## 2025-01-29 ENCOUNTER — NON-APPOINTMENT (OUTPATIENT)
Age: 45
End: 2025-01-29

## 2025-04-25 ENCOUNTER — APPOINTMENT (OUTPATIENT)
Dept: DERMATOLOGY | Facility: CLINIC | Age: 45
End: 2025-04-25
Payer: MEDICARE

## 2025-04-25 DIAGNOSIS — D22.9 MELANOCYTIC NEVI, UNSPECIFIED: ICD-10-CM

## 2025-04-25 DIAGNOSIS — Z12.83 ENCOUNTER FOR SCREENING FOR MALIGNANT NEOPLASM OF SKIN: ICD-10-CM

## 2025-04-25 DIAGNOSIS — L21.9 SEBORRHEIC DERMATITIS, UNSPECIFIED: ICD-10-CM

## 2025-04-25 DIAGNOSIS — L64.9 ANDROGENIC ALOPECIA, UNSPECIFIED: ICD-10-CM

## 2025-04-25 PROCEDURE — 99214 OFFICE O/P EST MOD 30 MIN: CPT

## 2025-05-09 ENCOUNTER — APPOINTMENT (OUTPATIENT)
Dept: DERMATOLOGY | Facility: CLINIC | Age: 45
End: 2025-05-09

## 2025-07-13 ENCOUNTER — EMERGENCY (EMERGENCY)
Facility: HOSPITAL | Age: 45
LOS: 1 days | End: 2025-07-13
Admitting: EMERGENCY MEDICINE
Payer: MEDICARE

## 2025-07-13 VITALS
SYSTOLIC BLOOD PRESSURE: 124 MMHG | OXYGEN SATURATION: 98 % | DIASTOLIC BLOOD PRESSURE: 80 MMHG | HEART RATE: 84 BPM | TEMPERATURE: 98 F | RESPIRATION RATE: 18 BRPM

## 2025-07-13 PROCEDURE — 99284 EMERGENCY DEPT VISIT MOD MDM: CPT

## 2025-07-13 PROCEDURE — 70360 X-RAY EXAM OF NECK: CPT | Mod: 26

## 2025-07-13 RX ORDER — IBUPROFEN 200 MG
400 TABLET ORAL ONCE
Refills: 0 | Status: COMPLETED | OUTPATIENT
Start: 2025-07-13 | End: 2025-07-13

## 2025-07-13 RX ORDER — DIPHENHYDRAMINE HCL 12.5MG/5ML
50 ELIXIR ORAL ONCE
Refills: 0 | Status: COMPLETED | OUTPATIENT
Start: 2025-07-13 | End: 2025-07-13

## 2025-07-13 RX ORDER — LIDOCAINE HYDROCHLORIDE 20 MG/ML
5 JELLY TOPICAL ONCE
Refills: 0 | Status: COMPLETED | OUTPATIENT
Start: 2025-07-13 | End: 2025-07-13

## 2025-07-13 RX ORDER — MAGNESIUM, ALUMINUM HYDROXIDE 200-200 MG
30 TABLET,CHEWABLE ORAL ONCE
Refills: 0 | Status: COMPLETED | OUTPATIENT
Start: 2025-07-13 | End: 2025-07-13

## 2025-07-13 RX ADMIN — Medication 50 MILLIGRAM(S): at 23:27

## 2025-07-13 RX ADMIN — LIDOCAINE HYDROCHLORIDE 5 MILLILITER(S): 20 JELLY TOPICAL at 23:28

## 2025-07-13 RX ADMIN — Medication 400 MILLIGRAM(S): at 23:27

## 2025-07-13 NOTE — ED PROVIDER NOTE - PHYSICAL EXAMINATION
Gen - WDWN, NAD, comfortable and non-toxic appearing, speaking in full sentences, phonating well   Skin - warm, dry, intact, no acute rash or lesions  HEENT - AT/NC, PERRL, pupils symmetric b/l, MMM, no nasal discharge, airway patent,  o/p clear with no visible FB, edema, or erythema or trismus, no pooling of saliva, neck supple and FROM, no JVD b/l   CV - S1S2, R/R/R  Resp - CTAB, no r/r/w  GI - NABS, soft, ND, NT, no CVAT b/l, no palpable pulsatile or bulging mass   MS - W/W/P, no C/C/E, No acute or gross deformities noted to extremities. No midline spinal tenderness or step off on palpation  Neuro - AxOx3, no focal neuro deficits, ambulatory without gait disturbance

## 2025-07-13 NOTE — ED ADULT TRIAGE NOTE - CHIEF COMPLAINT QUOTE
pt c/o of foreign object stuck in throat x 2 hours. unsure what may be stuck, endorses able to eat and drink. able to speak full complete sentences.

## 2025-07-13 NOTE — ED PROVIDER NOTE - OBJECTIVE STATEMENT
45 yo F with no known PMHx, presenting c/o L sided throat discomfort since 8pm. Pt reports having pasta with spices and noted feeling something stuck in the L side of her throat with sensation. Unsure if it feels like a FB or allergic reaction to the spices. Denies fever, chills, drooling, stridors, change in phonation, SOB, wheezing, cough, congestion, HA, dizziness, N/V/D/C, abdominal pain, rash, neck pain, focal weakness, numbness, tingling, and malaise

## 2025-07-13 NOTE — ED PROVIDER NOTE - CARE PROVIDER_API CALL
Claudette Valles  Otolaryngology Head And Neck Surgery  [address]  [place] [address]  Phone: [phone]  Fax: [phone]  Follow Up Time:

## 2025-07-13 NOTE — ED PROVIDER NOTE - PATIENT PORTAL LINK FT
You can access the FollowMyHealth Patient Portal offered by [facility] by registering at the following website: [email or web address]. By joining [employer]’s FollowMyHealth portal, you will also be able to view your health information using other applications (apps) compatible with our system.

## 2025-07-13 NOTE — ED PROVIDER NOTE - NSFOLLOWUPINSTRUCTIONS_ED_ALL_ED_FT
Foreign Body Sensation in Throat    You were evaluated for a sensation of something stuck in your throat (foreign body sensation). Your exam and X-rays did not show any visible foreign object. Many times, this feeling can be due to minor irritation, swelling, a scratch, or anxiety after swallowing food or an object.    At-Home Care  1. Diet and Fluids    Eat soft foods (like yogurt, pudding, applesauce, soup) for the next 24-48 hours.  Drink plenty of fluids; warm drinks may help soothe your throat.  Avoid hard or scratchy foods (chips, toast, raw vegetables) until your throat feels normal.  2. Throat Care    You may take acetaminophen (Tylenol) or ibuprofen (Advil, Motrin) for soreness, unless you have a reason not to.  Gargle with warm salt water (1/2 teaspoon salt in a glass of warm water) 2–3 times a day to ease irritation.  3. Monitoring    Mild irritation or "something stuck" sensation can last 1–2 days but should gradually improve.  Sometimes, a small scratch or mild swelling can cause the sensation even after the foreign object is gone.  When to Return to the ER or Call Your Healthcare Provider  Seek immediate care if you have:    Increased or severe pain in the throat, neck, or chest  Difficulty swallowing saliva, food, or water  Drooling or inability to swallow  Difficulty breathing, noisy breathing, or wheezing  Coughing up or vomiting blood  High fever, chills, or feeling very unwell  Follow-Up  If your symptoms are not improving after 2–3 days, or if you begin to feel worse, contact your healthcare provider.  If you continue to have sensations of something stuck, further testing may be needed.  Summary:  Eat soft foods, drink fluids, use salt water gargles, and monitor your symptoms. Most people feel better in a day or two. Return for care if you develop difficulty swallowing, breathing, or have worsening pain.    If you have questions or concerns, contact your healthcare provider.

## 2025-07-13 NOTE — ED ADULT NURSE NOTE - OBJECTIVE STATEMENT
Pt came in she says she had pasta and meatballs at around 8 pm and isn't sure if something is stuck in her throat or she is having an allergic reaction. The left side of her throat looks a bit swollen in comparison to the right. Her airway is patent. She shows no signs of distress.

## 2025-07-13 NOTE — ED PROVIDER NOTE - CLINICAL SUMMARY MEDICAL DECISION MAKING FREE TEXT BOX
43 yo F with no known PMHx, presenting c/o L sided throat discomfort since 8pm. Pt reports having pasta with spices and noted feeling something stuck in the L side of her throat with sensation. Unsure if it feels like a FB or allergic reaction to the spices.   - pt well appearing with no airway involvement on exam, not suspicious for sharp or retained FB based on exam and history, pt tolerated po solids and liquids fine at bedside, phonating well, reports improvement in sx s/p GI cocktail, xrays with no visible radioopaque FB, and pt able to tolerate po with no airway involvement.   - shared medical decision performed, no indication for further imaging in the ED, given ENT f/u for 24hr re-eval if sx are still persistent

## 2025-07-13 NOTE — ED ADULT NURSE NOTE - NSFALLLASTSIX_ED_ALL_ED
Outbound call, virtual hold    Onset: Last night  Location / description: Constant lower abdominal pain. Denies any fevers, emesis, diarrhea, urinary complaints or vaginal discharge/bleeding.    Precipitating Factors: None  Pain Scale (1-10), 10 highest: 8/10  Associated Symptoms: Nausea  What improves / worsens symptoms: None  Symptom specific medications: None  Recent visits (last 3-4 weeks) for same reason or recent surgery: None    PLAN:   See Provider/Go to Urgent Care within next 4 hours    Patient/Caller agrees to follow recommendations.    Reason for Disposition  • [1] MILD-MODERATE pain AND [2] constant AND [3] present > 2 hours    Protocols used: ABDOMINAL PAIN - FEMALE-A-AH    Future Appointments   Date Time Provider Department Center   3/7/2022  9:30 AM Chan Collazo DO [id number]       Patient agreed to 24hr disposition. Appointment made for 3/7 @ 0930 with Dr. Collazo. No other needs expressed at this time. Agreed to call back for any further assistance or if symptoms worsen.   
No.

## 2025-07-13 NOTE — ED PROVIDER NOTE - PROGRESS NOTE DETAILS
pt tolerated po solids and liquids fine at bedside, phonating well, reports improvement in sx s/p GI cocktail, xrays with no visible radioopaque FB, and pt able to tolerate po with no airway involvement

## 2025-07-14 VITALS
RESPIRATION RATE: 16 BRPM | SYSTOLIC BLOOD PRESSURE: 116 MMHG | OXYGEN SATURATION: 98 % | TEMPERATURE: 98 F | HEART RATE: 66 BPM | DIASTOLIC BLOOD PRESSURE: 77 MMHG

## 2025-07-14 RX ADMIN — Medication 50 MILLIGRAM(S): at 00:16

## 2025-07-14 RX ADMIN — Medication 400 MILLIGRAM(S): at 00:17

## 2025-07-14 RX ADMIN — Medication 30 MILLILITER(S): at 00:17

## 2025-07-16 DIAGNOSIS — R09.A2 FOREIGN BODY SENSATION, THROAT: ICD-10-CM

## 2025-07-16 DIAGNOSIS — Z91.018 ALLERGY TO OTHER FOODS: ICD-10-CM

## 2025-09-04 ENCOUNTER — APPOINTMENT (OUTPATIENT)
Dept: OTOLARYNGOLOGY | Facility: CLINIC | Age: 45
End: 2025-09-04

## 2025-09-04 VITALS
WEIGHT: 130 LBS | TEMPERATURE: 98.4 F | HEART RATE: 98 BPM | DIASTOLIC BLOOD PRESSURE: 77 MMHG | HEIGHT: 63 IN | SYSTOLIC BLOOD PRESSURE: 120 MMHG | BODY MASS INDEX: 23.04 KG/M2

## 2025-09-04 DIAGNOSIS — J34.3 HYPERTROPHY OF NASAL TURBINATES: ICD-10-CM

## 2025-09-04 DIAGNOSIS — J32.8 OTHER CHRONIC SINUSITIS: ICD-10-CM

## 2025-09-04 DIAGNOSIS — J34.89 OTHER SPECIFIED DISORDERS OF NOSE AND NASAL SINUSES: ICD-10-CM
